# Patient Record
Sex: FEMALE | Race: WHITE | NOT HISPANIC OR LATINO | Employment: OTHER | URBAN - NONMETROPOLITAN AREA
[De-identification: names, ages, dates, MRNs, and addresses within clinical notes are randomized per-mention and may not be internally consistent; named-entity substitution may affect disease eponyms.]

---

## 2024-02-20 ENCOUNTER — HOSPITAL ENCOUNTER (OUTPATIENT)
Dept: RADIOLOGY | Facility: HOSPITAL | Age: 79
Discharge: HOME | End: 2024-02-20
Attending: FAMILY MEDICINE
Payer: MEDICARE

## 2024-02-20 ENCOUNTER — OFFICE VISIT (OUTPATIENT)
Dept: VASCULAR SURGERY | Facility: CLINIC | Age: 79
End: 2024-02-20
Payer: MEDICARE

## 2024-02-20 VITALS
BODY MASS INDEX: 34.49 KG/M2 | HEIGHT: 65 IN | SYSTOLIC BLOOD PRESSURE: 116 MMHG | RESPIRATION RATE: 13 BRPM | DIASTOLIC BLOOD PRESSURE: 72 MMHG | HEART RATE: 75 BPM | WEIGHT: 207 LBS

## 2024-02-20 DIAGNOSIS — L81.9 HYPERPIGMENTATION OF SKIN: ICD-10-CM

## 2024-02-20 DIAGNOSIS — R60.0 EDEMA, LOWER EXTREMITY: ICD-10-CM

## 2024-02-20 DIAGNOSIS — R60.0 EDEMA OF BOTH LEGS: ICD-10-CM

## 2024-02-20 DIAGNOSIS — M79.605 LEG PAIN, BILATERAL: ICD-10-CM

## 2024-02-20 DIAGNOSIS — M79.606 LEG PAIN: ICD-10-CM

## 2024-02-20 DIAGNOSIS — I87.2 VENOUS INSUFFICIENCY: Primary | ICD-10-CM

## 2024-02-20 DIAGNOSIS — M79.604 LEG PAIN, BILATERAL: ICD-10-CM

## 2024-02-20 PROCEDURE — 99204 OFFICE O/P NEW MOD 45 MIN: CPT | Mod: ,,, | Performed by: FAMILY MEDICINE

## 2024-02-20 PROCEDURE — 93970 EXTREMITY STUDY: CPT | Mod: 26,,, | Performed by: FAMILY MEDICINE

## 2024-02-20 PROCEDURE — 93970 EXTREMITY STUDY: CPT | Mod: TC,PN

## 2024-02-20 RX ORDER — WARFARIN SODIUM 5 MG/1
5 TABLET ORAL
COMMUNITY
Start: 2023-12-05

## 2024-02-20 RX ORDER — LEVOTHYROXINE SODIUM 100 UG/1
100 TABLET ORAL
COMMUNITY
Start: 2024-01-15

## 2024-02-20 RX ORDER — METOPROLOL SUCCINATE 25 MG/1
25 TABLET, EXTENDED RELEASE ORAL 2 TIMES DAILY
COMMUNITY
Start: 2023-09-02

## 2024-02-20 RX ORDER — METOPROLOL TARTRATE 25 MG/1
25 TABLET, FILM COATED ORAL 2 TIMES DAILY
COMMUNITY
Start: 2024-01-02

## 2024-02-20 RX ORDER — FUROSEMIDE 40 MG/1
40 TABLET ORAL 2 TIMES DAILY
COMMUNITY
Start: 2024-01-18

## 2024-02-20 RX ORDER — EMPAGLIFLOZIN 10 MG/1
10 TABLET, FILM COATED ORAL
COMMUNITY
Start: 2024-01-03

## 2024-02-20 RX ORDER — SPIRONOLACTONE 25 MG/1
25 TABLET ORAL
COMMUNITY
Start: 2023-12-22

## 2024-02-20 RX ORDER — DIGOXIN 125 MCG
0.12 TABLET ORAL
COMMUNITY
Start: 2024-02-03

## 2024-02-20 NOTE — PROGRESS NOTES
[facility] CLINIC NOTE    Patient ID: Karyn Leyva is a 78 y.o. female.    I. HISTORY     Chief Complaint:   Chief Complaint   Patient presents with    Wound Care     EXAM ROOM 4.  NP REFERRAL DR. AU, RE: VENOUS ULCER        HPI: Karyn Leyva is a 78 y.o. female who is referred here today by Dr Au for evaluation of   Bilateral lower extremity edema, hyperpigmentation, pain and history of wound.  Symptoms are  progressive/worsening and began  greater than 1 years ago.  Location is bilateral  lower extremities below the knees. Symptoms are  worse at the end of the day.  History of venous interventions includes  none.   denies family history of venous disease.   Patient states that a couple of months ago she developed a weeping superficial ulceration of the left lower extremity and she was placed on antibiotics and Lasix.  This has since resolved.      Patient underwent a bilateral complete venous reflux study today 02/20/2024 and the results were discussed with the patient.  This study shows no evidence of DVT bilaterally.  Pulsatile flow noted on the right.  Dilation and proximal reflux of the bilateral great saphenous veins and left anterior thigh accessory vein.  Refluxing varicosities also noted on the left.  No reflux in the bilateral small saphenous veins.      Venous Disease Medical Necessity Documentation Initial Visit Date: 2/20/24 Return Check Date:    Have you ever had a rupture or bleed from a varicose vein in your leg(s)?              [] Yes  [x] No   [] Yes   [] No   Have you ever been diagnosed with phlebitis, cellulitis, or inflammation in the area of the varicose veins of  your leg(s)?  [x] Yes  [] No    [] Yes   [] No   Do you have darkened or inflamed skin on your legs?   [x] Yes   [] No   [] Yes   [] No   Do you have leg swelling?     [x] Yes   [] No   [] Yes   [] No   Do you have leg pain?   [x] Yes   [] No   [] Yes   [] No   If yes, describe the type of pain?    [x]   Stabbing []   Radiating [x]  Aching   [x]  Tightness [x]  Throbbing               [x]  Burning []  Cramping              Do you have leg discomfort?   [x] Yes   [] No   [] Yes   [] No   If yes, describe the type of discomfort?    [x]  Heaviness [x]  Fullness   []  Restlessness [x] Tired/Fatigued [x] Itching              Have you ever worn compression hose?   [x] Yes   [] No   [] Yes   [] No   If yes, how long?  2w         Do you elevate your legs while sitting?   [x] Yes   [] No   [] Yes   [] No   Does venous disease (varicose veins, ulcers, skin changes, leg pain/swelling) interfere with your daily life?  If yes, check activities you are limited or unable to do.    []  Shower  [x]   Walk  []  Exercise  [] Play with children/grandchildren  []  Shop [] Work [x] Stand for any period of time [] Sleep                               [x] Sitting for an extended period of time.           [x] Yes   [] No   [] Yes   [] No   Do you exercise/have you tried to exercise (i.e.  Walk our participate in a regular exercise routine)?  [] Yes  [x] No   [] Yes   [] No   BMI 34.5             Past Medical History:   Diagnosis Date    Diabetes mellitus     Hypertension         Past Surgical History:   Procedure Laterality Date    HERNIA REPAIR      HYSTERECTOMY      JOINT REPLACEMENT         Social History     Tobacco Use   Smoking Status Never   Smokeless Tobacco Never         Current Outpatient Medications:     digoxin (LANOXIN) 125 mcg tablet, Take 0.125 mg by mouth., Disp: , Rfl:     furosemide (LASIX) 40 MG tablet, Take 40 mg by mouth 2 (two) times daily., Disp: , Rfl:     JARDIANCE 10 mg tablet, Take 10 mg by mouth., Disp: , Rfl:     levothyroxine (SYNTHROID) 100 MCG tablet, Take 100 mcg by mouth., Disp: , Rfl:     metoprolol succinate (TOPROL-XL) 25 MG 24 hr tablet, Take 25 mg by mouth 2 (two) times daily., Disp: , Rfl:     metoprolol tartrate (LOPRESSOR) 25 MG tablet, Take 25 mg by mouth 2 (two) times daily., Disp: , Rfl:     spironolactone  (ALDACTONE) 25 MG tablet, Take 25 mg by mouth., Disp: , Rfl:     warfarin (COUMADIN) 5 MG tablet, Take 5 mg by mouth., Disp: , Rfl:     Review of Systems   Constitutional:  Negative for activity change, chills, diaphoresis, fatigue and fever.   Respiratory:  Negative for cough and shortness of breath.    Cardiovascular:  Positive for leg swelling. Negative for chest pain and claudication.        Hyperpigmentation LE   Gastrointestinal:  Negative for nausea and vomiting.   Musculoskeletal:  Positive for leg pain. Negative for joint swelling.   Integumentary:  Negative for rash and wound.   Neurological:  Negative for weakness and numbness.          II. PHYSICAL EXAM     Physical Exam  Constitutional:       General: She is awake. She is not in acute distress.     Appearance: Normal appearance. She is obese. She is not ill-appearing or toxic-appearing.   HENT:      Head: Normocephalic and atraumatic.   Eyes:      Extraocular Movements: Extraocular movements intact.      Conjunctiva/sclera: Conjunctivae normal.      Pupils: Pupils are equal, round, and reactive to light.   Neck:      Vascular: No carotid bruit or JVD.   Cardiovascular:      Rate and Rhythm: Normal rate and regular rhythm.      Pulses:           Dorsalis pedis pulses are detected w/ Doppler on the right side and detected w/ Doppler on the left side.        Posterior tibial pulses are detected w/ Doppler on the right side and detected w/ Doppler on the left side.      Heart sounds: No murmur heard.  Pulmonary:      Effort: Pulmonary effort is normal. No respiratory distress.      Breath sounds: No stridor. No wheezing, rhonchi or rales.   Musculoskeletal:         General: No swelling, tenderness or deformity.      Right lower le+ Pitting Edema present.      Left lower le+ Pitting Edema present.      Comments:  Hyperpigmentation of the bilateral lower extremities with pitting edema, without evidence of open ulceration or cellulitis.   Feet:       Comments:   Biphasic hand-held dopplerable pulses of the bilateral dorsalis pedis and posterior tibial arteries.  Skin:     General: Skin is warm.      Capillary Refill: Capillary refill takes less than 2 seconds.      Coloration: Skin is not ashen.      Findings: No bruising, erythema, lesion, rash or wound.   Neurological:      Mental Status: She is alert and oriented to person, place, and time.      Motor: No weakness.   Psychiatric:         Speech: Speech normal.         Behavior: Behavior normal. Behavior is cooperative.         Reticular/Spider veins noted:  RLE: medial calf and ankle  LLE: medial calf and ankle    Varicose veins noted:  RLE: none  LLE:  none    CEAP Classification  Clinical Signs: Class 5 - Skin changes as defined above with healed ulceration  Etiologic Classification: Primary  Anatomic distribution: Superficial  Pathophysiologic dysfunction: Reflux                         Venous Clinical Severity Score  Pain:1=Occasional, not restricting activity or requiring analgesics  Varicose Veins: 1=Few, scattered. Branch varicose veins  Venous Edema: 3=Morning edema above ankle and requiring activity change, elevation  Pigmentation: 2=Diffuse over most of gaiter distribution (lower 1/3) or recent pigmentation (purple)  Inflammation: 0=None  Induration: 0=None  Number of Active Ulcers: 0=0  Active Ulceration, Duration: 0=None  Active Ulcer Size: 0=None  Compressive Therapy: 0=Not used or not compliant  Total Score: 7       III. ASSESSMENT & PLAN (MEDICAL DECISION MAKING)     1. Venous insufficiency    2. Hyperpigmentation of skin    3. Leg pain, bilateral    4. Edema, lower extremity        Assessment/Diagnosis and Plan:  Ultrasound of lower extremities reveals   Positive evidence of venous insufficiency in the bilateral  great saphenous veins and left anterior thigh accessory vein.  Plan for conservative medical treatment at this time. The patient may benefit from endovenous ablation in the future.      - Start compression with 15-20 mmHg Rx stockings  - Therapeutic leg elevation  - Calf pumping exercises  - RTC 3 months for further evaluation        Maciej Harrison DO

## 2024-04-15 ENCOUNTER — TELEPHONE (OUTPATIENT)
Dept: ORTHOPEDICS | Facility: CLINIC | Age: 79
End: 2024-04-15
Payer: MEDICARE

## 2024-04-15 DIAGNOSIS — M25.512 LEFT SHOULDER PAIN: Primary | ICD-10-CM

## 2024-05-21 ENCOUNTER — OFFICE VISIT (OUTPATIENT)
Dept: VASCULAR SURGERY | Facility: CLINIC | Age: 79
End: 2024-05-21
Payer: MEDICARE

## 2024-05-21 VITALS
HEIGHT: 65 IN | DIASTOLIC BLOOD PRESSURE: 73 MMHG | HEART RATE: 90 BPM | SYSTOLIC BLOOD PRESSURE: 110 MMHG | WEIGHT: 196.81 LBS | BODY MASS INDEX: 32.79 KG/M2 | RESPIRATION RATE: 18 BRPM

## 2024-05-21 DIAGNOSIS — I87.2 VENOUS INSUFFICIENCY: Primary | ICD-10-CM

## 2024-05-21 DIAGNOSIS — R60.0 EDEMA, LOWER EXTREMITY: ICD-10-CM

## 2024-05-21 DIAGNOSIS — L81.9 HYPERPIGMENTATION OF SKIN: ICD-10-CM

## 2024-05-21 PROCEDURE — 99213 OFFICE O/P EST LOW 20 MIN: CPT | Mod: ,,, | Performed by: FAMILY MEDICINE

## 2024-05-21 RX ORDER — POTASSIUM CHLORIDE 750 MG/1
10 TABLET, EXTENDED RELEASE ORAL 2 TIMES DAILY
COMMUNITY

## 2024-05-21 NOTE — PROGRESS NOTES
[facility] CLINIC NOTE    Patient ID: Karyn Leyva is a 78 y.o. female.    I. HISTORY     Chief Complaint:   Chief Complaint   Patient presents with    Follow-up     3M COMP        HPI: Karyn Leyva is a 78 y.o. female who presents today for follow-up after undergoing 3 months of conservative therapy for chronic venous insufficiency consisting of 20-30 mm of mercury compression stockings, calf pumping exercises and therapeutic leg elevation.  The patient states that these measures have helped to improve her lower extremity symptoms including swelling, pain and heaviness.  She feels as though her symptoms are adequately controlled at this time would like to continue with conservative therapy.    Clinical summary:  Patient initially presented on 02/20 24 Dr Au for evaluation of   Bilateral lower extremity edema, hyperpigmentation, pain and history of wound.  Symptoms are  progressive/worsening and began  greater than 1 years ago.  Location is bilateral  lower extremities below the knees. Symptoms are  worse at the end of the day.  History of venous interventions includes  none.   denies family history of venous disease.   Patient states that a couple of months ago she developed a weeping superficial ulceration of the left lower extremity and she was placed on antibiotics and Lasix.  This has since resolved.    Bilateral complete venous reflux study today 02/20/2024 shows no evidence of DVT bilaterally.  Pulsatile flow noted on the right.  Dilation and proximal reflux of the bilateral great saphenous veins and left anterior thigh accessory vein.  Refluxing varicosities also noted on the left.  No reflux in the bilateral small saphenous veins.    Venous Disease Medical Necessity Documentation Initial Visit Date: 2/20/24 Return Check Date:   05/21/2024   Have you ever had a rupture or bleed from a varicose vein in your leg(s)?              [] Yes  [x] No   [] Yes   [x] No   Have you ever been diagnosed with  phlebitis, cellulitis, or inflammation in the area of the varicose veins of  your leg(s)?  [x] Yes  [] No    [x] Yes   [] No   Do you have darkened or inflamed skin on your legs?   [x] Yes   [] No   [x] Yes   [] No   Do you have leg swelling?     [x] Yes   [] No   [x] Yes   [] No   Do you have leg pain?   [x] Yes   [] No   [] Yes   [x] No   If yes, describe the type of pain?    [x]   Stabbing []  Radiating [x]  Aching   [x]  Tightness [x]  Throbbing               [x]  Burning []  Cramping              Do you have leg discomfort?   [x] Yes   [] No   [x] Yes   [] No   If yes, describe the type of discomfort?    [x]  Heaviness [x]  Fullness   []  Restlessness [x] Tired/Fatigued [x] Itching           itching   Have you ever worn compression hose?   [x] Yes   [] No   [x] Yes   [] No   If yes, how long?  2w    3 MONTHS     Do you elevate your legs while sitting?   [x] Yes   [] No   [x] Yes   [] No   Does venous disease (varicose veins, ulcers, skin changes, leg pain/swelling) interfere with your daily life?  If yes, check activities you are limited or unable to do.    []  Shower  [x]   Walk  []  Exercise  [] Play with children/grandchildren  []  Shop [] Work [x] Stand for any period of time [] Sleep                               [x] Sitting for an extended period of time.           [x] Yes   [] No   [x] Yes   [] No   Do you exercise/have you tried to exercise (i.e.  Walk our participate in a regular exercise routine)?  [] Yes  [x] No   [x] Yes   [] No   BMI 34.5   32.75          Past Medical History:   Diagnosis Date    Diabetes mellitus     Hypertension         Past Surgical History:   Procedure Laterality Date    HERNIA REPAIR      HYSTERECTOMY      JOINT REPLACEMENT         Social History     Tobacco Use   Smoking Status Never   Smokeless Tobacco Never         Current Outpatient Medications:     digoxin (LANOXIN) 125 mcg tablet, Take 0.125 mg by mouth., Disp: , Rfl:     furosemide (LASIX) 40 MG tablet, Take 40 mg by  mouth 2 (two) times daily., Disp: , Rfl:     JARDIANCE 10 mg tablet, Take 10 mg by mouth., Disp: , Rfl:     levothyroxine (SYNTHROID) 100 MCG tablet, Take 100 mcg by mouth., Disp: , Rfl:     metoprolol tartrate (LOPRESSOR) 25 MG tablet, Take 25 mg by mouth 2 (two) times daily., Disp: , Rfl:     potassium chloride (KLOR-CON) 10 MEQ TbSR, Take 10 mEq by mouth 2 (two) times daily., Disp: , Rfl:     spironolactone (ALDACTONE) 25 MG tablet, Take 25 mg by mouth., Disp: , Rfl:     warfarin (COUMADIN) 5 MG tablet, Take 5 mg by mouth., Disp: , Rfl:     metoprolol succinate (TOPROL-XL) 25 MG 24 hr tablet, Take 25 mg by mouth 2 (two) times daily. (Patient not taking: Reported on 5/21/2024), Disp: , Rfl:     Review of Systems   Constitutional:  Negative for activity change, chills, diaphoresis, fatigue and fever.   Respiratory:  Negative for cough and shortness of breath.    Cardiovascular:  Positive for leg swelling. Negative for chest pain and claudication.        Hyperpigmentation LE   Gastrointestinal:  Negative for nausea and vomiting.   Musculoskeletal:  Positive for leg pain. Negative for joint swelling.   Integumentary:  Negative for rash and wound.   Neurological:  Negative for weakness and numbness.          II. PHYSICAL EXAM     Physical Exam  Constitutional:       General: She is awake. She is not in acute distress.     Appearance: Normal appearance. She is obese. She is not ill-appearing or toxic-appearing.   HENT:      Head: Normocephalic and atraumatic.   Eyes:      Extraocular Movements: Extraocular movements intact.      Conjunctiva/sclera: Conjunctivae normal.      Pupils: Pupils are equal, round, and reactive to light.   Neck:      Vascular: No carotid bruit or JVD.   Cardiovascular:      Rate and Rhythm: Normal rate and regular rhythm.      Pulses:           Dorsalis pedis pulses are detected w/ Doppler on the right side and detected w/ Doppler on the left side.        Posterior tibial pulses are detected w/  Doppler on the right side and detected w/ Doppler on the left side.      Heart sounds: No murmur heard.  Pulmonary:      Effort: Pulmonary effort is normal. No respiratory distress.      Breath sounds: No stridor. No wheezing, rhonchi or rales.   Musculoskeletal:         General: No swelling, tenderness or deformity.      Right lower le+ Pitting Edema present.      Left lower le+ Pitting Edema present.      Comments:  Hyperpigmentation of the bilateral lower extremities with pitting edema, without evidence of open ulceration or cellulitis.   Feet:      Comments:   Biphasic hand-held dopplerable pulses of the bilateral dorsalis pedis and posterior tibial arteries.  Skin:     General: Skin is warm.      Capillary Refill: Capillary refill takes less than 2 seconds.      Coloration: Skin is not ashen.      Findings: No bruising, erythema, lesion, rash or wound.   Neurological:      Mental Status: She is alert and oriented to person, place, and time.      Motor: No weakness.   Psychiatric:         Speech: Speech normal.         Behavior: Behavior normal. Behavior is cooperative.         Reticular/Spider veins noted:  RLE: medial calf and ankle  LLE: medial calf and ankle    Varicose veins noted:  RLE: none  LLE:  none    CEAP Classification  Clinical Signs: Class 5 - Skin changes as defined above with healed ulceration  Etiologic Classification: Primary  Anatomic distribution: Superficial  Pathophysiologic dysfunction: Reflux       Venous Clinical Severity Score  Pain:1=Occasional, not restricting activity or requiring analgesics  Varicose Veins: 1=Few, scattered. Branch varicose veins  Venous Edema: 3=Morning edema above ankle and requiring activity change, elevation  Pigmentation: 2=Diffuse over most of gaiter distribution (lower 1/3) or recent pigmentation (purple)  Inflammation: 0=None  Induration: 0=None  Number of Active Ulcers: 0=0  Active Ulceration, Duration: 0=None  Active Ulcer Size:  0=None  Compressive Therapy: 3=Full compliance, stockings + elevation  Total Score: 10       III. ASSESSMENT & PLAN (MEDICAL DECISION MAKING)     1. Venous insufficiency    2. Hyperpigmentation of skin    3. Edema, lower extremity        Assessment/Diagnosis and Plan:  Previous Ultrasound of lower extremities reveals positive evidence of venous insufficiency in the bilateral  great saphenous veins and left anterior thigh accessory vein.  Plan for conservative medical treatment at this time. The patient may benefit from endovenous ablation in the future.     - continue compression with 15-20 mmHg Rx stockings  - Therapeutic leg elevation  - Calf pumping exercises  - RTC 3 months for further evaluation        Maciej Harrison DO

## 2024-11-19 ENCOUNTER — OFFICE VISIT (OUTPATIENT)
Dept: VASCULAR SURGERY | Facility: CLINIC | Age: 79
End: 2024-11-19
Payer: MEDICARE

## 2024-11-19 VITALS
SYSTOLIC BLOOD PRESSURE: 110 MMHG | DIASTOLIC BLOOD PRESSURE: 67 MMHG | HEART RATE: 87 BPM | BODY MASS INDEX: 30.82 KG/M2 | HEIGHT: 65 IN | RESPIRATION RATE: 14 BRPM | WEIGHT: 185 LBS

## 2024-11-19 DIAGNOSIS — I87.2 VENOUS INSUFFICIENCY: Primary | ICD-10-CM

## 2024-11-19 DIAGNOSIS — L81.9 HYPERPIGMENTATION OF SKIN: ICD-10-CM

## 2024-11-19 DIAGNOSIS — R60.0 EDEMA, LOWER EXTREMITY: ICD-10-CM

## 2024-11-19 PROCEDURE — 99214 OFFICE O/P EST MOD 30 MIN: CPT | Mod: ,,, | Performed by: FAMILY MEDICINE

## 2024-11-19 NOTE — PROGRESS NOTES
[facility] CLINIC NOTE    Patient ID: Karyn Leyva is a 79 y.o. female.    I. HISTORY     Chief Complaint:   Chief Complaint   Patient presents with    PPD Reading     Exam room 3.  6M venous        HPI: Karyn Leyva is a 79 y.o. female who presents today for follow-up for a six-month venous follow-up.  She is currently undergone 9 months of conservative therapy for chronic venous insufficiency consisting of 20-30 mm of mercury compression stockings, calf pumping exercises and therapeutic leg elevation.  The patient states that these measures have helped to improve her lower extremity symptoms including swelling, pain and heaviness.  Hyperpigmentation has also improved.  She feels as though her symptoms are adequately controlled at this time would like to continue with conservative therapy.    Clinical summary:  Patient initially presented on 02/20 24 Dr Au for evaluation of   Bilateral lower extremity edema, hyperpigmentation, pain and history of wound.  Symptoms are  progressive/worsening and began  greater than 1 years ago.  Location is bilateral  lower extremities below the knees. Symptoms are  worse at the end of the day.  History of venous interventions includes  none.   denies family history of venous disease.   Patient states that a couple of months ago she developed a weeping superficial ulceration of the left lower extremity and she was placed on antibiotics and Lasix.  This has since resolved.    Bilateral complete venous reflux study today 02/20/2024 shows no evidence of DVT bilaterally.  Pulsatile flow noted on the right.  Dilation and proximal reflux of the bilateral great saphenous veins and left anterior thigh accessory vein.  Refluxing varicosities also noted on the left.  No reflux in the bilateral small saphenous veins.    Venous Disease Medical Necessity Documentation Initial Visit Date: 2/20/24 Return Check Date:   05/21/2024   Have you ever had a rupture or bleed from a varicose vein  in your leg(s)?              [] Yes  [x] No   [] Yes   [x] No   Have you ever been diagnosed with phlebitis, cellulitis, or inflammation in the area of the varicose veins of  your leg(s)?  [x] Yes  [] No    [x] Yes   [] No   Do you have darkened or inflamed skin on your legs?   [x] Yes   [] No   [x] Yes   [] No   Do you have leg swelling?     [x] Yes   [] No   [x] Yes   [] No   Do you have leg pain?   [x] Yes   [] No   [] Yes   [x] No   If yes, describe the type of pain?    [x]   Stabbing []  Radiating [x]  Aching   [x]  Tightness [x]  Throbbing               [x]  Burning []  Cramping              Do you have leg discomfort?   [x] Yes   [] No   [x] Yes   [] No   If yes, describe the type of discomfort?    [x]  Heaviness [x]  Fullness   []  Restlessness [x] Tired/Fatigued [x] Itching           itching   Have you ever worn compression hose?   [x] Yes   [] No   [x] Yes   [] No   If yes, how long?  2w    3 MONTHS     Do you elevate your legs while sitting?   [x] Yes   [] No   [x] Yes   [] No   Does venous disease (varicose veins, ulcers, skin changes, leg pain/swelling) interfere with your daily life?  If yes, check activities you are limited or unable to do.    []  Shower  [x]   Walk  []  Exercise  [] Play with children/grandchildren  []  Shop [] Work [x] Stand for any period of time [] Sleep                               [x] Sitting for an extended period of time.           [x] Yes   [] No   [x] Yes   [] No   Do you exercise/have you tried to exercise (i.e.  Walk our participate in a regular exercise routine)?  [] Yes  [x] No   [x] Yes   [] No   BMI 34.5   32.75          Past Medical History:   Diagnosis Date    Diabetes mellitus     Hypertension         Past Surgical History:   Procedure Laterality Date    HERNIA REPAIR      HYSTERECTOMY      JOINT REPLACEMENT         Social History     Tobacco Use   Smoking Status Never   Smokeless Tobacco Never         Current Outpatient Medications:     digoxin (LANOXIN) 125 mcg  tablet, Take 0.125 mg by mouth., Disp: , Rfl:     furosemide (LASIX) 40 MG tablet, Take 40 mg by mouth 2 (two) times daily., Disp: , Rfl:     JARDIANCE 10 mg tablet, Take 10 mg by mouth., Disp: , Rfl:     levothyroxine (SYNTHROID) 100 MCG tablet, Take 100 mcg by mouth., Disp: , Rfl:     metoprolol tartrate (LOPRESSOR) 25 MG tablet, Take 25 mg by mouth 2 (two) times daily., Disp: , Rfl:     potassium chloride (KLOR-CON) 10 MEQ TbSR, Take 10 mEq by mouth 2 (two) times daily., Disp: , Rfl:     spironolactone (ALDACTONE) 25 MG tablet, Take 25 mg by mouth., Disp: , Rfl:     warfarin (COUMADIN) 5 MG tablet, Take 5 mg by mouth., Disp: , Rfl:     metoprolol succinate (TOPROL-XL) 25 MG 24 hr tablet, Take 25 mg by mouth 2 (two) times daily., Disp: , Rfl:     Review of Systems   Constitutional:  Negative for activity change, chills, diaphoresis, fatigue and fever.   Respiratory:  Negative for cough and shortness of breath.    Cardiovascular:  Positive for leg swelling. Negative for chest pain and claudication.        Hyperpigmentation LE   Gastrointestinal:  Negative for nausea and vomiting.   Musculoskeletal:  Positive for leg pain. Negative for joint swelling.   Integumentary:  Negative for rash and wound.   Neurological:  Negative for weakness and numbness.          II. PHYSICAL EXAM     Physical Exam  Constitutional:       General: She is awake. She is not in acute distress.     Appearance: Normal appearance. She is obese. She is not ill-appearing or toxic-appearing.   HENT:      Head: Normocephalic and atraumatic.   Eyes:      Extraocular Movements: Extraocular movements intact.      Conjunctiva/sclera: Conjunctivae normal.      Pupils: Pupils are equal, round, and reactive to light.   Neck:      Vascular: No carotid bruit or JVD.   Cardiovascular:      Rate and Rhythm: Normal rate and regular rhythm.      Pulses:           Dorsalis pedis pulses are detected w/ Doppler on the right side and detected w/ Doppler on the left  side.        Posterior tibial pulses are detected w/ Doppler on the right side and detected w/ Doppler on the left side.      Heart sounds: No murmur heard.  Pulmonary:      Effort: Pulmonary effort is normal. No respiratory distress.      Breath sounds: No stridor. No wheezing, rhonchi or rales.   Musculoskeletal:         General: No swelling, tenderness or deformity.      Right lower le+ Pitting Edema present.      Left lower le+ Pitting Edema present.      Comments:  Hyperpigmentation of the bilateral lower extremities with pitting edema, without evidence of open ulceration or cellulitis.   Feet:      Comments:   Biphasic hand-held dopplerable pulses of the bilateral dorsalis pedis and posterior tibial arteries.  Skin:     General: Skin is warm.      Capillary Refill: Capillary refill takes less than 2 seconds.      Coloration: Skin is not ashen.      Findings: No bruising, erythema, lesion, rash or wound.   Neurological:      Mental Status: She is alert and oriented to person, place, and time.      Motor: No weakness.   Psychiatric:         Speech: Speech normal.         Behavior: Behavior normal. Behavior is cooperative.         Reticular/Spider veins noted:  RLE: medial calf and ankle  LLE: medial calf and ankle    Varicose veins noted:  RLE: none  LLE:  none    CEAP Classification  Clinical Signs: Class 5 - Skin changes as defined above with healed ulceration  Etiologic Classification: Primary  Anatomic distribution: Superficial  Pathophysiologic dysfunction: Reflux       Venous Clinical Severity Score  Pain:1=Occasional, not restricting activity or requiring analgesics  Varicose Veins: 1=Few, scattered. Branch varicose veins  Venous Edema: 3=Morning edema above ankle and requiring activity change, elevation  Pigmentation: 2=Diffuse over most of gaiter distribution (lower 1/3) or recent pigmentation (purple)  Inflammation: 0=None  Induration: 0=None  Number of Active Ulcers: 0=0  Active Ulceration,  Duration: 0=None  Active Ulcer Size: 0=None  Compressive Therapy: 3=Full compliance, stockings + elevation  Total Score: 10       III. ASSESSMENT & PLAN (MEDICAL DECISION MAKING)     1. Venous insufficiency    2. Hyperpigmentation of skin    3. Edema, lower extremity          Assessment/Diagnosis and Plan:  Previous Ultrasound of lower extremities reveals positive evidence of venous insufficiency in the bilateral  great saphenous veins and left anterior thigh accessory vein.  Plan for conservative medical treatment at this time. The patient may benefit from endovenous ablation in the future.     - continue compression with 15-20 mmHg Rx stockings  - Therapeutic leg elevation  - Calf pumping exercises  - RTC 12 months for further evaluation        Maciej Harrison, DO